# Patient Record
Sex: FEMALE
[De-identification: names, ages, dates, MRNs, and addresses within clinical notes are randomized per-mention and may not be internally consistent; named-entity substitution may affect disease eponyms.]

---

## 2017-05-28 ENCOUNTER — HOSPITAL ENCOUNTER (EMERGENCY)
Dept: HOSPITAL 31 - C.ER | Age: 67
Discharge: HOME | End: 2017-05-28
Payer: MEDICARE

## 2017-05-28 VITALS — OXYGEN SATURATION: 100 %

## 2017-05-28 VITALS
SYSTOLIC BLOOD PRESSURE: 122 MMHG | RESPIRATION RATE: 18 BRPM | DIASTOLIC BLOOD PRESSURE: 74 MMHG | HEART RATE: 85 BPM | TEMPERATURE: 98 F
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
## 2017-05-28 DIAGNOSIS — S16.1XXA: Primary | ICD-10-CM
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
## 2017-05-28 LAB
BILIRUB UR-MCNC: NEGATIVE MG/DL
GLUCOSE UR STRIP-MCNC: NORMAL MG/DL
KETONES UR STRIP-MCNC: NEGATIVE MG/DL
LEUKOCYTE ESTERASE UR-ACNC: (no result) LEU/UL
PH UR STRIP: 5 [PH] (ref 5–8)
PROT UR STRIP-MCNC: NEGATIVE MG/DL
RBC # UR STRIP: NEGATIVE /UL
RBC #/AREA URNS HPF: 1 /HPF (ref 0–3)
SP GR UR STRIP: 1.02 (ref 1–1.03)
UROBILINOGEN UR-MCNC: NORMAL MG/DL (ref 0.2–1)
WBC #/AREA URNS HPF: 4 /HPF (ref 0–5)

## 2017-05-28 NOTE — C.PDOC
History Of Present Illness


65 yo female come in for evaluation of Right sided neck pain and swelling 

gradually developed for past 2 days. Pt reports, pain gradually started 

yesterday in AM over Right sided occipital area and neck and gradually worsen 

during the day, associated with some swelling over upper back. Pain is 

localized over Right neck and upper back and worse with Right arm movement. 

Otherwise, pt denies fever, chills, denies worse headache of life, dizziness, 

vertigo, visual changes, focal deficits, recent illness, sore throat, CP, SOB, 

dyspnea, diaphoresis, palpitation, abd. pain, N/V/D, back pain, UTI sx, denies 

weakness, sensory or vascular deficits to B/L UEs. Ambulate to ED for evaluation

, appears in pain.


Time Seen by Provider: 05/28/17 20:19


Chief Complaint (Nursing): Back Pain





Past Medical History


Vital Signs: 


 Last Vital Signs











Temp  98 F   05/28/17 21:14


 


Pulse  85   05/28/17 21:14


 


Resp  18   05/28/17 21:14


 


BP  122/74   05/28/17 21:14


 


Pulse Ox  100   05/28/17 21:14











Surgical History: Appendectomy





- [facility] Procedures








DIPHTHERIA TOXOID ADMIN (06/03/14)


IMMOBILIZ/WOUND ATTN NEC (05/15/14)


TETANUS TOXOID ADMINIST (06/03/14)








Family History: States: No Known Family Hx





- Social History


Hx Alcohol Use: No


Hx Substance Use: No





- Immunization History


Hx Tetanus Toxoid Vaccination: No


Hx Influenza Vaccination: No


Hx Pneumococcal Vaccination: No





Physical Exam





- Physical Exam


Appears: Well, Non-toxic, No Acute Distress


Skin: Normal Color, Warm, Dry, No Rash


Eye(s): bilateral: PERRL


Nose: No Flaring


Oral Mucosa: Moist


Throat: Normal, No Erythema, No Exudate, No Drooling


Neck: No Midline Cervical Tenderness, Paracervical Tenderness (Diffuse Right 

sided lateral neck reproducible tenderness over trapezium muscle with moderate 

muscle spasm extend from occipital area down to Right upper arm.), No Step Off 

Deformity, Supple


Chest: Symmetrical, No Deformity, No Tenderness


Cardiovascular: Rhythm Regular


Respiratory: Normal Breath Sounds, No Stridor, No Wheezing


Back: Normal Inspection, No Vertebral Tenderness, No Paraspinal Tenderness


Extremity: No Pedal Edema


Neurological/Psych: Oriented x3, Normal Speech





ED Course And Treatment


O2 Sat by Pulse Oximetry: 100


Pulse Ox Interpretation: Normal





- Other Rad


  ** C-spine


X-Ray: Interpreted by Me, Viewed By Me


Interpretation: (-) acute fx or sublux





  ** Right shoulder 


X-Ray: Interpreted by Me, Viewed By Me


Interpretation: (+) mod DJD, no acute fx or dislocation


Progress Note: On re-evaluation, pt is afebrile, hemodynamicaly stable.  Non-

toxic.  Pt reports, moderate improvement in pain.  head: AT/NC.  ENT: no acute 

findings.  neck: (+) Right sided cervical strain with muscle spasm. No midline 

tenderness, no skin changes.  Lungs: CTA B/L, BS equal B/L.  Neurologicaly 

intact.  Imaging review and appears without acute abnormalities.  Pt was 

advised on course of ds.  ref. to f/u with PMD in 2-3 days for re-eavl.  Return 

if any worsening or new changes.





Disposition


Counseled Patient/Family Regarding: Studies Performed, Diagnosis, Need For 

Followup, Rx Given





- Disposition


Referrals: 


Ralph Lyons MD [Staff Provider] - 


Disposition: HOME/ ROUTINE


Disposition Time: 20:55


Condition: STABLE


Additional Instructions: 


Light duty to Right arm





Take medication as prescribed





Follow up with PMD in 2-3 days for re-evaluation.


return to ED if any worsening or new changes.


Prescriptions: 


Methocarbamol [Robaxin] 500 mg PO TID #14 tab


traMADol [Ultram] 50 mg PO TID #7 tab


Instructions:  Cervical Sprain (ED), Muscle Spasm (ED)





- Clinical Impression


Clinical Impression: 


 Cervical strain

## 2017-05-29 NOTE — RAD
PROCEDURE:  Cervical Spine Radiographs.



HISTORY:

Pain. 



COMPARISON:

None. 



FINDINGS:



BONES:

Alignment maintained. No fracture.  Dens Intact. 



DISC SPACES:

Degenerative changes C5-6 and to lesser extent C6-7. This includes 

disc space narrowing and non marginal osteophyte formation. 



SOFT TISSUES:

Normal. No prevertebral soft tissue swelling. 



OTHER FINDINGS:

None.



IMPRESSION:

No acute findings related to/accounting  for the clinical 

presentation.

## 2017-05-29 NOTE — RAD
PROCEDURE:  Radiographs of the Right Shoulder



HISTORY:

pain







COMPARISON:

Six



FINDINGS:



BONES:

No acute fracture.



JOINTS:

Degenerative changes, of right acromioclavicular joint -severe.  

Moderate glenohumeral degenerative change with high riding humeral 

head relative to the glenoid indicative of rotator cuff disease. 

Medial inferior humeral head spur adjacent to the glenoid.



SOFT TISSUES:

Normal.



OTHER FINDINGS:

None.



IMPRESSION:

Degenerative changes as described. No acute findings.



___________________________________________________________



Concordant results with the preliminary interpretation rendered by 

the emergency department physician

procedure.

## 2017-05-31 ENCOUNTER — HOSPITAL ENCOUNTER (EMERGENCY)
Dept: HOSPITAL 31 - C.ER | Age: 67
LOS: 1 days | Discharge: HOME | End: 2017-06-01
Payer: MEDICARE

## 2017-05-31 DIAGNOSIS — T39.395A: ICD-10-CM
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
## 2017-05-31 DIAGNOSIS — L29.9: Primary | ICD-10-CM

## 2017-05-31 PROCEDURE — 96375 TX/PRO/DX INJ NEW DRUG ADDON: CPT

## 2017-05-31 PROCEDURE — 96374 THER/PROPH/DIAG INJ IV PUSH: CPT

## 2017-05-31 PROCEDURE — 99284 EMERGENCY DEPT VISIT MOD MDM: CPT

## 2017-06-01 VITALS
OXYGEN SATURATION: 100 % | SYSTOLIC BLOOD PRESSURE: 157 MMHG | DIASTOLIC BLOOD PRESSURE: 88 MMHG | HEART RATE: 64 BPM | RESPIRATION RATE: 16 BRPM | TEMPERATURE: 98.1 F

## 2017-06-01 NOTE — C.PDOC
History Of Present Illness


Patient is a 66 year old female who presents to the ER with a complaint 

itchiness and redness to the face and upper extremities after taking 

diclofenac. Patient denies SOB or throat swelling.


Chief Complaint (Nursing): Allergic Reaction


History Per: Patient


History/Exam Limitations: no limitations


Onset/Duration Of Symptoms: Hrs


Current Symptoms Are (Timing): Still Present


Possible Cause: Medication (diclofenac)


Associated Symptoms: Itching, Redness (Face and upper extremities).  denies: 

Swelling, Trouble Swallowing


Home/EMS Treatment: None


Recent travel outside of the [place]: No





Past Medical History


Reviewed: Historical Data, Nursing Documentation, Vital Signs


Vital Signs: 


 Last Vital Signs











Temp  98 F   05/31/17 23:38


 


Pulse  139 H  05/31/17 23:38


 


Resp  20   05/31/17 23:38


 


BP  118/76   05/31/17 23:38


 


Pulse Ox  96   06/01/17 00:34














- Medical History


PMH: HTN


Surgical History: Appendectomy





- [facility] Procedures








DIPHTHERIA TOXOID ADMIN (06/03/14)


IMMOBILIZ/WOUND ATTN NEC (05/15/14)


TETANUS TOXOID ADMINIST (06/03/14)








Family History: States: Unknown Family Hx





- Social History


Hx Alcohol Use: No


Hx Substance Use: No





- Immunization History


Hx Tetanus Toxoid Vaccination: No


Hx Influenza Vaccination: No


Hx Pneumococcal Vaccination: No





Review Of Systems


ENT: Negative for: Throat Swelling


Respiratory: Negative for: Shortness of Breath


Skin: Positive for: Other (Redness to face and upper extremities. Itchiness)





Physical Exam





- Physical Exam


Appears: Non-toxic, Other (mild distress from itchiness)


Skin: Warm, Dry, Other (Slight redness to face and upper extremities)


Head: Atraumatic, Normacephalic


Eye(s): bilateral: Normal Inspection, EOMI


Oral Mucosa: Moist


Chest: Symmetrical, No Tenderness


Cardiovascular: Rhythm Regular, No Murmur


Respiratory: Normal Breath Sounds, No Rales, No Rhonchi, No Wheezing


Gastrointestinal/Abdominal: Soft, No Tenderness


Neurological/Psych: Oriented x3, Normal Speech, Normal Cognition





ED Course And Treatment


O2 Sat by Pulse Oximetry: 96 (Room air)


Pulse Ox Interpretation: Normal


Progress Note: Benadryl, pepcid and solu-medrol administered.





Disposition


Counseled Patient/Family Regarding: Diagnosis





- Disposition


Referrals: 


[facility] [Outside]


Disposition: HOME/ ROUTINE


Disposition Time: 01:26


Condition: IMPROVED


Prescriptions: 


DiphenhydrAMINE [Benadryl] 25 mg PO Q6 #20 cap


Famotidine [Pepcid] 20 mg PO BID #14 tab


Methylprednisolone [Medrol Dose Pack (21 tabs)] 4 mg PO DAILY #21 mg


Instructions:  General Allergic Reaction (ED)


Forms:  Gen Discharge Inst Romansh


Print Language: Syriac





- POA


Present On Arrival: None





- Clinical Impression


Clinical Impression: 


 Allergic reaction








- Scribe Statement


The provider has reviewed the documentation as recorded by the Scribeliud Dunham





All medical record entries made by the Davidibeliud were at my direction and 

personally dictated by me. I have reviewed the chart and agree that the record 

accurately reflects my personal performance of the history, physical exam, 

medical decision making, and the department course for this patient. I have 

also personally directed, reviewed, and agree with the discharge instructions 

and disposition.

## 2017-07-13 ENCOUNTER — HOSPITAL ENCOUNTER (OUTPATIENT)
Dept: HOSPITAL 31 - C.ENDO | Age: 67
Discharge: HOME | End: 2017-07-13
Attending: INTERNAL MEDICINE
Payer: MEDICARE

## 2017-07-13 VITALS — SYSTOLIC BLOOD PRESSURE: 110 MMHG | HEART RATE: 56 BPM | RESPIRATION RATE: 13 BRPM | DIASTOLIC BLOOD PRESSURE: 48 MMHG

## 2017-07-13 VITALS — OXYGEN SATURATION: 100 %

## 2017-07-13 VITALS — TEMPERATURE: 97.8 F

## 2017-07-13 VITALS — BODY MASS INDEX: 23.8 KG/M2

## 2017-07-13 DIAGNOSIS — Z98.82: ICD-10-CM

## 2017-07-13 DIAGNOSIS — E11.9: ICD-10-CM

## 2017-07-13 DIAGNOSIS — Z12.11: Primary | ICD-10-CM

## 2017-07-13 DIAGNOSIS — E78.5: ICD-10-CM

## 2017-07-13 DIAGNOSIS — D12.5: ICD-10-CM

## 2017-07-13 DIAGNOSIS — Z88.8: ICD-10-CM

## 2017-07-13 DIAGNOSIS — K29.70: ICD-10-CM

## 2017-07-13 DIAGNOSIS — Z98.890: ICD-10-CM

## 2017-07-13 DIAGNOSIS — Z79.84: ICD-10-CM

## 2017-07-13 DIAGNOSIS — K64.0: ICD-10-CM

## 2017-07-13 DIAGNOSIS — Z88.6: ICD-10-CM

## 2017-07-13 DIAGNOSIS — Z79.899: ICD-10-CM

## 2017-08-12 ENCOUNTER — HOSPITAL ENCOUNTER (EMERGENCY)
Dept: HOSPITAL 31 - C.ER | Age: 67
Discharge: HOME | End: 2017-08-12
Payer: MEDICARE

## 2017-08-12 VITALS
SYSTOLIC BLOOD PRESSURE: 130 MMHG | DIASTOLIC BLOOD PRESSURE: 81 MMHG | TEMPERATURE: 98.2 F | HEART RATE: 61 BPM | RESPIRATION RATE: 20 BRPM

## 2017-08-12 VITALS — BODY MASS INDEX: 23.8 KG/M2

## 2017-08-12 VITALS — OXYGEN SATURATION: 100 %

## 2017-08-12 DIAGNOSIS — L50.0: Primary | ICD-10-CM

## 2017-08-12 PROCEDURE — 99283 EMERGENCY DEPT VISIT LOW MDM: CPT

## 2017-08-12 PROCEDURE — 96372 THER/PROPH/DIAG INJ SC/IM: CPT

## 2017-08-12 NOTE — C.PDOC
History Of Present Illness


The patient is a 67yo female, presents to the ED with complaints of an itching 

rash all over her body, starting since this morning. Patient is unsure as to 

the cause of the rash and denies any new clothes, lotion use or eating new 

foods. Pt also took claritin and benadryl today with no relief. She denies any 

trouble breathing or swallowing. She currently, offers no additional medical 

complaints.


Time Seen by Provider: 08/12/17 18:58


Chief Complaint (Nursing): Allergic Reaction


History Per: Patient


History/Exam Limitations: no limitations


Onset/Duration Of Symptoms: Days


Current Symptoms Are (Timing): Still Present


Possible Cause: Unknown


Associated Symptoms: Skin Rash, Itching





Past Medical History


Reviewed: Historical Data, Nursing Documentation, Vital Signs


Vital Signs: 


 Last Vital Signs











Temp  98.2 F   08/12/17 19:56


 


Pulse  61   08/12/17 19:56


 


Resp  20   08/12/17 19:56


 


BP  130/81   08/12/17 19:56


 


Pulse Ox  100   08/12/17 20:00














- Medical History


PMH: Anxiety, Depression, HTN, Hypercholesterolemia, Osteoporosis


   Denies: Chronic Kidney Disease


Surgical History: Appendectomy, Endoscopy





- [facility] Procedures








DIPHTHERIA TOXOID ADMIN (06/03/14)


IMMOBILIZ/WOUND ATTN NEC (05/15/14)


TETANUS TOXOID ADMINIST (06/03/14)








Family History: States: Unknown Family Hx





- Social History


Hx Alcohol Use: No


Hx Substance Use: No





- Immunization History


Hx Tetanus Toxoid Vaccination: No


Hx Influenza Vaccination: No


Hx Pneumococcal Vaccination: No





Review Of Systems


ENT: Negative for: Throat Swelling


Respiratory: Negative for: Shortness of Breath


Skin: Positive for: Rash





Physical Exam





- Physical Exam


Appears: Well, No Acute Distress


Skin: Rash (urticarial rash to upper extremities and torso)


Head: Atraumatic, Normacephalic


Eye(s): bilateral: Normal Inspection, EOMI


Nose: Normal


Oral Mucosa: Moist


Tongue: Normal Appearing, No Swelling


Lips: Normal Appearing, No Swelling


Throat: Normal, No Erythema, No Exudate, No Drooling


Neck: Normal ROM


Chest: Symmetrical


Cardiovascular: Rhythm Regular


Respiratory: Normal Breath Sounds, No Accessory Muscle Use, No Rhonchi, No 

Wheezing


Extremity: Bilateral: Atraumatic, Normal Color And Temperature, Normal ROM


Neurological/Psych: Oriented x3, Normal Speech


Gait: Steady





ED Course And Treatment


O2 Sat by Pulse Oximetry: 100 (RA)


Pulse Ox Interpretation: Normal





Medical Decision Making


Medical Decision Making: 


Impression: Urticarial rash


Plan:


* Benadryl IM


* Prednisone PO





Patient with itching and hives to body. Benadryl IM given at patient request 

and prednisone PO. Advise patient to avoid potential allergens and to continue 

using antihistamine.





Disposition


Counseled Patient/Family Regarding: Diagnosis, Need For Followup, Rx Given





- Disposition


Disposition: HOME/ ROUTINE


Disposition Time: 19:52


Condition: IMPROVED


Additional Instructions: 


You may continue taking Benadryl or allergy medicine 25-50mg every 4-6 hours as 

needed for itching and rash


Avoid any potential allergens





Usted puede seguir tomando Benadryl o medicina para la alergia 25-50mg cada 4-6 

horas segn sea necesario para picazn y erupcin cutnea


Evitar cualquier alergeno potencial


Prescriptions: 


Prednisone 50 mg PO DAILY #4 tablet


Instructions:  Urticaria (ED)


Forms:  [facility] (English)


Print Language: Malay





- POA


Present On Arrival: None





- Clinical Impression


Clinical Impression: 


 Allergic urticaria








- Scribe Statement


The provider has reviewed the documentation as recorded by the Candace Jewell


Provider Attestation:


All medical record entries made by the Candace were at my direction and 

personally dictated by me. I have reviewed the chart and agree that the record 

accurately reflects my personal performance of the history, physical exam, 

medical decision making, and the department course for this patient. I have 

also personally directed, reviewed, and agree with the discharge instructions 

and disposition.

## 2018-07-10 ENCOUNTER — HOSPITAL ENCOUNTER (EMERGENCY)
Dept: HOSPITAL 31 - C.ER | Age: 68
LOS: 1 days | Discharge: HOME | End: 2018-07-11
Payer: MEDICARE

## 2018-07-10 VITALS — BODY MASS INDEX: 23.8 KG/M2

## 2018-07-10 DIAGNOSIS — E78.00: ICD-10-CM

## 2018-07-10 DIAGNOSIS — R10.9: ICD-10-CM

## 2018-07-10 DIAGNOSIS — I10: ICD-10-CM

## 2018-07-10 DIAGNOSIS — K86.2: Primary | ICD-10-CM

## 2018-07-10 LAB
ALBUMIN SERPL-MCNC: 4.4 G/DL (ref 3.5–5)
ALBUMIN/GLOB SERPL: 1.3 {RATIO} (ref 1–2.1)
ALT SERPL-CCNC: 27 U/L (ref 9–52)
APTT BLD: 28 SECONDS (ref 21–34)
AST SERPL-CCNC: 28 U/L (ref 14–36)
BASOPHILS # BLD AUTO: 0 K/UL (ref 0–0.2)
BASOPHILS NFR BLD: 0.3 % (ref 0–2)
BILIRUB UR-MCNC: NEGATIVE MG/DL
BUN SERPL-MCNC: 16 MG/DL (ref 7–17)
CALCIUM SERPL-MCNC: 9.3 MG/DL (ref 8.6–10.4)
EOSINOPHIL # BLD AUTO: 0.1 K/UL (ref 0–0.7)
EOSINOPHIL NFR BLD: 0.6 % (ref 0–4)
EOSINOPHIL NFR BLD: 2 % (ref 0–4)
ERYTHROCYTE [DISTWIDTH] IN BLOOD BY AUTOMATED COUNT: 14.1 % (ref 11.5–14.5)
GFR NON-AFRICAN AMERICAN: > 60
GLUCOSE UR STRIP-MCNC: NORMAL MG/DL
HGB BLD-MCNC: 13.7 G/DL (ref 11–16)
INR PPP: 1.1
LEUKOCYTE ESTERASE UR-ACNC: (no result) LEU/UL
LIPASE: 84 U/L (ref 23–300)
LYMPHOCYTE: 7 % (ref 20–40)
LYMPHOCYTES # BLD AUTO: 0.9 K/UL (ref 1–4.3)
LYMPHOCYTES NFR BLD AUTO: 8.7 % (ref 20–40)
MCH RBC QN AUTO: 30.9 PG (ref 27–31)
MCHC RBC AUTO-ENTMCNC: 33.1 G/DL (ref 33–37)
MCV RBC AUTO: 93.3 FL (ref 81–99)
MONOCYTE: 1 % (ref 0–10)
MONOCYTES # BLD: 0.3 K/UL (ref 0–0.8)
MONOCYTES NFR BLD: 2.3 % (ref 0–10)
NEUTROPHILS # BLD: 9.5 K/UL (ref 1.8–7)
NEUTROPHILS NFR BLD AUTO: 85 % (ref 50–75)
NEUTROPHILS NFR BLD AUTO: 88.1 % (ref 50–75)
NEUTS BAND NFR BLD: 3 % (ref 0–2)
NRBC BLD AUTO-RTO: 0.1 % (ref 0–2)
PH UR STRIP: 5 [PH] (ref 5–8)
PLATELET # BLD EST: NORMAL 10*3/UL
PLATELET # BLD: 319 K/UL (ref 130–400)
PMV BLD AUTO: 7.8 FL (ref 7.2–11.7)
PROT UR STRIP-MCNC: NEGATIVE MG/DL
PROTHROMBIN TIME: 12.2 SECONDS (ref 9.7–12.2)
RBC # BLD AUTO: 4.45 MIL/UL (ref 3.8–5.2)
RBC # UR STRIP: NEGATIVE /UL
RBC MORPH BLD: NORMAL
SP GR UR STRIP: 1.03 (ref 1–1.03)
SQUAMOUS EPITHIAL: 8 /HPF (ref 0–5)
TOTAL CELLS COUNTED BLD: 100
UROBILINOGEN UR-MCNC: NORMAL MG/DL (ref 0.2–1)
VARIANT LYMPHS NFR BLD MANUAL: 2 % (ref 0–0)
WBC # BLD AUTO: 10.8 K/UL (ref 4.8–10.8)

## 2018-07-10 PROCEDURE — 80053 COMPREHEN METABOLIC PANEL: CPT

## 2018-07-10 PROCEDURE — 96374 THER/PROPH/DIAG INJ IV PUSH: CPT

## 2018-07-10 PROCEDURE — 74177 CT ABD & PELVIS W/CONTRAST: CPT

## 2018-07-10 PROCEDURE — 99285 EMERGENCY DEPT VISIT HI MDM: CPT

## 2018-07-10 PROCEDURE — 96375 TX/PRO/DX INJ NEW DRUG ADDON: CPT

## 2018-07-10 PROCEDURE — 85730 THROMBOPLASTIN TIME PARTIAL: CPT

## 2018-07-10 PROCEDURE — 83690 ASSAY OF LIPASE: CPT

## 2018-07-10 PROCEDURE — 81001 URINALYSIS AUTO W/SCOPE: CPT

## 2018-07-10 PROCEDURE — 86850 RBC ANTIBODY SCREEN: CPT

## 2018-07-10 PROCEDURE — 82948 REAGENT STRIP/BLOOD GLUCOSE: CPT

## 2018-07-10 PROCEDURE — 85025 COMPLETE CBC W/AUTO DIFF WBC: CPT

## 2018-07-10 PROCEDURE — 85610 PROTHROMBIN TIME: CPT

## 2018-07-10 PROCEDURE — 86900 BLOOD TYPING SEROLOGIC ABO: CPT

## 2018-07-10 NOTE — C.PDOC
History Of Present Illness


66 y/o female presents to the ER complaining of mid epigastric and 

periumbilical pain which began around 11 am today. Patient states that she has 

associated vomiting. Patient describes the pain as sharp, stabbing, and 

cramping and she rates the pain 4/10. Denies having fever and chills.


Time Seen by Provider: 07/10/18 21:22


Chief Complaint (Nursing): Abdominal Pain


History Per: Patient


History/Exam Limitations: no limitations


Onset/Duration Of Symptoms: Hrs


Current Symptoms Are (Timing): Still Present


Severity: Moderate


Location Of Pain/Discomfort: Epigastric, Periumbilical


Quality Of Discomfort: Sharp, Cramping, Stabbing


Associated Symptoms: Vomiting.  denies: Fever, Chills





Past Medical History


Reviewed: Historical Data, Nursing Documentation, Vital Signs


Vital Signs: 


 Last Vital Signs











Temp  98.3 F   07/10/18 20:19


 


Pulse  95 H  07/10/18 21:46


 


Resp  22   07/10/18 21:46


 


BP  111/77   07/10/18 21:46


 


Pulse Ox  100   07/10/18 21:46














- Medical History


PMH: Anxiety, Depression, HTN, Hypercholesterolemia, Osteoporosis


   Denies: Chronic Kidney Disease


Surgical History: Appendectomy, Endoscopy





- [facility] Procedures








DIPHTHERIA TOXOID ADMIN (06/03/14)


IMMOBILIZ/WOUND ATTN NEC (05/15/14)


TETANUS TOXOID ADMINIST (06/03/14)








Family History: States: No Known Family Hx





- Social History


Hx Alcohol Use: No


Hx Substance Use: No





- Immunization History


Hx Tetanus Toxoid Vaccination: No


Hx Influenza Vaccination: Yes


Hx Pneumococcal Vaccination: No





Review Of Systems


Constitutional: Negative for: Fever, Chills


Gastrointestinal: Positive for: Vomiting, Abdominal Pain.  Negative for: Nausea

, Diarrhea





Physical Exam





- Physical Exam


Appears: Non-toxic, No Acute Distress


Skin: Warm, Dry


Head: Normacephalic


Eye(s): bilateral: Normal Inspection


Oral Mucosa: Dry


Neck: Supple


Chest: Symmetrical


Cardiovascular: Rhythm Regular


Respiratory: No Rales, No Rhonchi, No Wheezing


Gastrointestinal/Abdominal: Soft, Tenderness (diffuse tenderness especially in 

periumbilical region), No Guarding, No Rebound


Extremity: Normal ROM


Neurological/Psych: Oriented x3





ED Course And Treatment





- Laboratory Results


Result Diagrams: 


 07/10/18 21:38





 07/10/18 21:38


O2 Sat by Pulse Oximetry: 98 (RA)


Pulse Ox Interpretation: Normal


Progress Note: Labs and UA ordered. Patient treated with Zofran IV, Morphine IV

, and IV Fluids.


Reevaluation Time: 01:11


Reassessment Condition: Improved





Medical Decision Making


Medical Decision Making: 





Upon provider reevaluation patient is feeling better, is medically stable, and 

requires no further treatment in the ED at this time. Patient will be 

discharged home  . Counseling was provided and all questions were answered 

regarding diagnosis and need for follow up with dr lyons. There is agreement 

to discharge plan. Return if symptoms persist or worsen.





Disposition


Counseled Patient/Family Regarding: Studies Performed, Diagnosis, Need For 

Followup, Rx Given





- Disposition


Referrals: 


Ralph Lyons MD [Primary Care Provider] - 


Disposition: HOME/ ROUTINE


Disposition Time: 21:22


Condition: FAIR


Additional Instructions: 


Please return if symptoms recur. Do follow up with dr lyons regarding the 1.5 

cm pancreatic tail cyst


Prescriptions: 


Dicyclomine [Dicyclomine HCl] 10 mg PO QID #20 cap


Instructions:  Acute Abdomen (Belly Pain), Adult (DC)


Forms:  [facility] (English)





- Clinical Impression


Clinical Impression: 


 Abdominal pain, Pancreatic cyst








- Scribe Statement


The provider has reviewed the documentation as recorded by the Candace Proctor


Provider Attestation: 





All medical record entries made by the Davidibe were at my direction and 

personally dictated by me. I have reviewed the chart and agree that the record 

accurately reflects my personal performance of the history, physical exam, 

medical decision making, and the department course for this patient. I have 

also personally directed, reviewed, and agree with the discharge instructions 

and disposition.

## 2018-07-11 VITALS
TEMPERATURE: 99.7 F | RESPIRATION RATE: 20 BRPM | DIASTOLIC BLOOD PRESSURE: 76 MMHG | HEART RATE: 85 BPM | SYSTOLIC BLOOD PRESSURE: 128 MMHG | OXYGEN SATURATION: 97 %

## 2018-07-11 NOTE — CT
Date of service: 



07/10/2018



PROCEDURE:  CT Abdomen and Pelvis without intravenous contrast



HISTORY:

periumbilical pain



COMPARISON:

None.



TECHNIQUE:

Multiple contiguous axial images were performed through the abdomen 

and pelvis with the use of intravenous contrast.  Subsequently, 

sagittal and coronal reformatted images were obtained. 



Radiation dose:



Total exam DLP = 336 mGy-cm.



This CT exam was performed using one or more of the following dose 

reduction techniques: Automated exposure control, adjustment of the 

mA and/or kV according to patient size, and/or use of iterative 

reconstruction technique.



FINDINGS:



LOWER THORAX:

Unremarkable. 



LIVER:

Unremarkable. No gross lesion or ductal dilatation.  



GALLBLADDER AND BILE DUCTS:

Mild gallbladder dilatation. 



PANCREAS:

1.5 centimeter rounded low-attenuation pancreatic lesion which has a 

Hounsfield unit attenuation of approximately 2. This may represent a 

cystic pancreatic lesion. Differential considerations may include a 

pancreatic pseudocyst versus cystic pancreatic neoplasm versus 

additional etiology. Further evaluation with multiphasic CT or MR is 

recommended if clinically indicated.



SPLEEN:

Unremarkable. 



ADRENALS:

Unremarkable. No mass. 



KIDNEYS AND URETERS:

Horseshoe kidney noted. 



VASCULATURE:

Unremarkable. No aortic aneurysm. 



BOWEL:

Stomach is fluid-filled and mildly dilated.



APPENDIX:

Normal appendix is not seen.  Postsurgical changes near the cecum, 

which are likely from prior appendectomy.  Clinical correlation. 



PERITONEUM:

Unremarkable. No free fluid. No free air. 



LYMPH NODES:

Unremarkable. No enlarged lymph nodes. 



BLADDER:

Unremarkable. 



REPRODUCTIVE:

Unremarkable. 



BONES:

Degenerative changes in the spine. Anterolisthesis of L5 on S1. 



OTHER FINDINGS:

Bilateral breast implants.



IMPRESSION:

1.5 centimeter cystic lesion/mass in the pancreatic tail. If the 

patient is at high risk for pancreatic malignancy, consider 

nonemergent follow-up pancreatic CT or MRI for further evaluation. 

Clinical correlation.



Stomach is fluid-filled and mildly dilated.



Horseshoe kidney.



Degenerative changes in the spine. Anterolisthesis of L5 on S1. 

Correlation with MRI may be helpful if clinically indicated. 



Additional findings as above.



These findings were preliminarily reported at 12:38 a.m. on 

07/11/2018 by Dr. Nivia Hoskins from [employer].

## 2018-09-28 ENCOUNTER — HOSPITAL ENCOUNTER (EMERGENCY)
Dept: HOSPITAL 31 - C.ER | Age: 68
Discharge: HOME | End: 2018-09-28
Payer: MEDICARE

## 2018-09-28 VITALS
RESPIRATION RATE: 16 BRPM | DIASTOLIC BLOOD PRESSURE: 52 MMHG | HEART RATE: 67 BPM | SYSTOLIC BLOOD PRESSURE: 103 MMHG | TEMPERATURE: 97.8 F

## 2018-09-28 VITALS — BODY MASS INDEX: 25.6 KG/M2

## 2018-09-28 VITALS — OXYGEN SATURATION: 96 %

## 2018-09-28 DIAGNOSIS — L29.9: Primary | ICD-10-CM

## 2018-09-28 DIAGNOSIS — T39.315A: ICD-10-CM

## 2018-09-28 PROCEDURE — 99285 EMERGENCY DEPT VISIT HI MDM: CPT

## 2018-09-28 PROCEDURE — 96375 TX/PRO/DX INJ NEW DRUG ADDON: CPT

## 2018-09-28 PROCEDURE — 96374 THER/PROPH/DIAG INJ IV PUSH: CPT

## 2018-09-28 PROCEDURE — 96361 HYDRATE IV INFUSION ADD-ON: CPT

## 2018-09-28 NOTE — C.PDOC
History Of Present Illness


66 y/o female presents to ED for evaluation of allergic reaction after taking 1 

tab of Naproxen 500mg this morning at 09:00. Pt states she developed numbness in

her tongue, and itching throughout her body 10 minutes after taking the 

medication. Denies difficulty swallowing, throat swelling, chest pain, shortness

of breath, or other associated symptoms. 





Time Seen by Provider: 09/28/18 09:23


Chief Complaint (Nursing): Allergic Reaction


History Per: Patient


History/Exam Limitations: no limitations





Past Medical History


Reviewed: Historical Data, Nursing Documentation, Vital Signs


Vital Signs: 





                                Last Vital Signs











Temp  98.6 F   09/28/18 09:23


 


Pulse  135 H  09/28/18 09:23


 


Resp  22   09/28/18 09:23


 


BP  86/58 L  09/28/18 09:23


 


Pulse Ox  96   09/28/18 09:23














- Medical History


PMH: Anxiety, Depression, HTN, Hypercholesterolemia, Osteoporosis


   Denies: Chronic Kidney Disease


Surgical History: Appendectomy, Endoscopy





- [facility] Procedures











DIPHTHERIA TOXOID ADMIN (06/03/14)


IMMOBILIZ/WOUND ATTN NEC (05/15/14)


TETANUS TOXOID ADMINIST (06/03/14)








Family History: States: Unknown Family Hx





- Social History


Hx Alcohol Use: No


Hx Substance Use: No





- Immunization History


Hx Tetanus Toxoid Vaccination: No


Hx Influenza Vaccination: No


Hx Pneumococcal Vaccination: No





Review Of Systems


Except As Marked, All Systems Reviewed And Found Negative.


Constitutional: Negative for: Fever, Chills


ENT: Negative for: Mouth Swelling, Throat Pain, Throat Swelling


Cardiovascular: Negative for: Chest Pain


Respiratory: Negative for: Shortness of Breath


Skin: Positive for: Other (diffuse redness and itching)


Neurological: Positive for: Numbness (tongue)





Physical Exam





- Physical Exam


Appears: Non-toxic, No Acute Distress


Skin: Warm, Dry, Other (diffuse erythema, pt noted scratching herself)


Head: Atraumatic, Normacephalic


Eye(s): bilateral: Normal Inspection


Oral Mucosa: Moist


Tongue: Normal Appearing, No Swelling, No Erythema


Lips: Normal Appearing, No Swelling


Neck: Normal ROM, Supple


Chest: Symmetrical


Cardiovascular: Rhythm Regular, No Murmur


Respiratory: Normal Breath Sounds, No Accessory Muscle Use, No Rales, No 

Rhonchi, No Wheezing


Gastrointestinal/Abdominal: Soft, No Tenderness


Extremity: Normal ROM, No Deformity


Neurological/Psych: Oriented x3, Normal Speech





ED Course And Treatment


O2 Sat by Pulse Oximetry: 96 (RA)


Pulse Ox Interpretation: Normal


Progress Note: Treated with benadryl, solumedrol and pepcid.  On re-evaluation 

lung clear feeling better.  Discharged in stable condition


Reassessment Condition: Improved





Medical Decision Making


Medical Decision Making: 


Plan:


Benadryl


Pepcid


Solu-Medrol


IV fluids








Disposition


Counseled Patient/Family Regarding: Studies Performed, Diagnosis, Need For 

Followup





- Disposition


Referrals: 


aRlph Lyons MD [Staff Provider] - 


Disposition: HOME/ ROUTINE


Disposition Time: 12:00


Condition: IMPROVED


Additional Instructions: 


Stop taking naproxen


Return to ED if any increase symptoms


Prescriptions: 


predniSONE [Prednisone] 40 mg PO DAILY #10 tab


Instructions:  Hives, Side Effects From Medicines


Forms:  [facility] (English)


Print Language: Malay





- Clinical Impression


Clinical Impression: 


 Allergic reaction caused by a drug








- PA / NP / Resident Statement


MD/DO has reviewed & agrees with the documentation as recorded.





- Scribe Statement


The provider has reviewed the documentation as recorded by the Scribe





KP





All medical record entries made by the Scribe were at my direction and 

personally dictated by me. I have reviewed the chart and agree that the record 

accurately reflects my personal performance of the history, physical exam, 

medical decision making, and the department course for this patient. I have also

 personally directed, reviewed, and agree with the discharge instructions and 

disposition.

## 2018-12-20 ENCOUNTER — HOSPITAL ENCOUNTER (EMERGENCY)
Dept: HOSPITAL 31 - C.ER | Age: 68
Discharge: HOME | End: 2018-12-20
Payer: MEDICARE

## 2018-12-20 VITALS
SYSTOLIC BLOOD PRESSURE: 128 MMHG | TEMPERATURE: 98 F | DIASTOLIC BLOOD PRESSURE: 72 MMHG | OXYGEN SATURATION: 97 % | HEART RATE: 80 BPM | RESPIRATION RATE: 20 BRPM

## 2018-12-20 VITALS — BODY MASS INDEX: 23.8 KG/M2

## 2018-12-20 DIAGNOSIS — E78.00: ICD-10-CM

## 2018-12-20 DIAGNOSIS — N39.0: Primary | ICD-10-CM

## 2018-12-20 DIAGNOSIS — I10: ICD-10-CM

## 2018-12-20 DIAGNOSIS — R10.12: ICD-10-CM

## 2018-12-20 LAB
ALBUMIN SERPL-MCNC: 4.3 G/DL (ref 3.5–5)
ALBUMIN/GLOB SERPL: 1.4 {RATIO} (ref 1–2.1)
ALT SERPL-CCNC: 11 U/L (ref 9–52)
AST SERPL-CCNC: 47 U/L (ref 14–36)
BASOPHILS # BLD AUTO: 0.1 K/UL (ref 0–0.2)
BASOPHILS NFR BLD: 0.5 % (ref 0–2)
BILIRUB UR-MCNC: NEGATIVE MG/DL
BUN SERPL-MCNC: 19 MG/DL (ref 7–17)
CALCIUM SERPL-MCNC: 9 MG/DL (ref 8.6–10.4)
EOSINOPHIL # BLD AUTO: 0.1 K/UL (ref 0–0.7)
EOSINOPHIL NFR BLD: 0.9 % (ref 0–4)
ERYTHROCYTE [DISTWIDTH] IN BLOOD BY AUTOMATED COUNT: 13.4 % (ref 11.5–14.5)
GFR NON-AFRICAN AMERICAN: > 60
GLUCOSE UR STRIP-MCNC: NORMAL MG/DL
HGB BLD-MCNC: 13.5 G/DL (ref 11–16)
LEUKOCYTE ESTERASE UR-ACNC: (no result) LEU/UL
LIPASE: 159 U/L (ref 23–300)
LYMPHOCYTES # BLD AUTO: 3.2 K/UL (ref 1–4.3)
LYMPHOCYTES NFR BLD AUTO: 22.2 % (ref 20–40)
MCH RBC QN AUTO: 31.1 PG (ref 27–31)
MCHC RBC AUTO-ENTMCNC: 33.3 G/DL (ref 33–37)
MCV RBC AUTO: 93.5 FL (ref 81–99)
MONOCYTES # BLD: 0.8 K/UL (ref 0–0.8)
MONOCYTES NFR BLD: 5.2 % (ref 0–10)
NEUTROPHILS # BLD: 10.3 K/UL (ref 1.8–7)
NEUTROPHILS NFR BLD AUTO: 71.2 % (ref 50–75)
NRBC BLD AUTO-RTO: 0 % (ref 0–2)
PH UR STRIP: 5 [PH] (ref 5–8)
PLATELET # BLD: 375 K/UL (ref 130–400)
PMV BLD AUTO: 8.3 FL (ref 7.2–11.7)
PROT UR STRIP-MCNC: NEGATIVE MG/DL
RBC # BLD AUTO: 4.33 MIL/UL (ref 3.8–5.2)
RBC # UR STRIP: NEGATIVE /UL
SP GR UR STRIP: 1.02 (ref 1–1.03)
SQUAMOUS EPITHIAL: 1 /HPF (ref 0–5)
UROBILINOGEN UR-MCNC: NORMAL MG/DL (ref 0.2–1)
WBC # BLD AUTO: 14.5 K/UL (ref 4.8–10.8)

## 2018-12-20 PROCEDURE — 96375 TX/PRO/DX INJ NEW DRUG ADDON: CPT

## 2018-12-20 PROCEDURE — 83690 ASSAY OF LIPASE: CPT

## 2018-12-20 PROCEDURE — 81001 URINALYSIS AUTO W/SCOPE: CPT

## 2018-12-20 PROCEDURE — 85025 COMPLETE CBC W/AUTO DIFF WBC: CPT

## 2018-12-20 PROCEDURE — 74176 CT ABD & PELVIS W/O CONTRAST: CPT

## 2018-12-20 PROCEDURE — 96374 THER/PROPH/DIAG INJ IV PUSH: CPT

## 2018-12-20 PROCEDURE — 99284 EMERGENCY DEPT VISIT MOD MDM: CPT

## 2018-12-20 PROCEDURE — 87086 URINE CULTURE/COLONY COUNT: CPT

## 2018-12-20 PROCEDURE — 80053 COMPREHEN METABOLIC PANEL: CPT

## 2018-12-20 NOTE — CT
Date of service: 



12/20/2018



PROCEDURE:  CT Abdomen and Pelvis..



HISTORY:

Left flank pain



COMPARISON:

Comparison made with prior study dated 07/10/2018..



TECHNIQUE:

Contiguous axial images of the abdomen and pelvis performed without 

oral or intravenous contrast material.  Additional 2D sagittal and 

coronal reformats generated. 



Radiation dose:



Total exam DLP = 375.85 mGy-cm.



This CT exam was performed using one or more of the following dose 

reduction techniques: Automated exposure control, adjustment of the 

mA and/or kV according to patient size, and/or use of iterative 

reconstruction technique..  The 



FINDINGS:



LOWER THORAX:

Unremarkable.



LIVER:

Liver exhibits normal size and attenuation pattern without masses 

collections or calcifications.  No obvious hepatic masses collections 

or calcifications..  No significant intrahepatic biliary ductal 

dilatation 



GALLBLADDER AND BILE DUCTS:

Gallbladder physiologically distended.  No evidence of intraluminal 

gallbladder calculi.  No significant ductal dilatation 



PANCREAS:

Less well seen on this study (due to the lack of circulating 

intravenous contrast material) is an elliptical shaped approximately 

14 mm x 11 mm low-attenuation lesion within the distal body/of 

pancreatic tail junction which exhibits Hounsfield units in the low 

single digits.  This could represent a small pancreatic pseudocyst 

however on benign or malignant cystic neoplasm of the pancreas not 

excluded..  



SPLEEN:

Spleen exhibits normal size and attenuation pattern without mass 

collection or calcification.. 



ADRENALS:

No definitive adrenal lesions. 



KIDNEYS AND URETERS:

Again noted is a horseshoe kidney.  No evidence of nephrolithiasis or 

hydronephrosis..  



BLADDER:

Urinary bladder is incompletely distended which presumably in part 

accounts for thick-walled appearance.  Correlation with urinalysis 

recommended to exclude a cystitis.



REPRODUCTIVE:

Unremarkable as visualized. 



APPENDIX:

The redemonstrated are what appear to represent opaque suture and/or 

metallic surgical clips in the right lower quadrant of the abdomen.  

The appendix is not seen with any certainty.  Findings could be 

secondary to prior appendectomy however clinical correlation with 

surgical history recommended.







BOWEL:

Evaluation of the bowel is somewhat limited due to the lack of oral 

contrast material.  The stomach is distended with food debris liquid 

and air.  Visualized loops of small bowel exhibit normal contour 

caliber.  No evidence of acute mechanical small bowel obstruction 

however note made of some fecalized content within few loops of small 

bowel suggesting mild stasis. 



There is a large amount of stool seen throughout the cecum, ascending 

transverse and to a lesser degree proximal descending and sigmoid 

colon consistent with fecal retention/constipation 



PERITONEUM:

Unremarkable. No fluid collection. No free air.



LYMPH NODES:

Unremarkable. No enlarged lymph nodes.  Small fat containing 

umbilical hernia. 



VASCULATURE:

Unremarkable. No aortic aneurysm. No aortic atherosclerotic 

calcification or mural plaque present.



BONES:

Minor multilevel degenerative spondylosis of the lower thoracic and 

lumbar spine.  Minimal anterior subluxation L5 over S1 felt to be 

secondary to facet arthropathy as noted obvious pars interarticularis 

defect the.  Degenerative changes both hip joints.



OTHER FINDINGS:

In situ right breast prosthesis again noted.  Left breast prosthesis 

noted on prior exam is a has not been visualized on this study.. 



IMPRESSION:

Less well seen on this study is an elliptical shaped cystic lesion 

within the distal pancreatic body/tail junction.  This lesion is of 

uncertain etiology and could represent a pancreatic pseudocyst 

however benign or malignant cystic pancreatic neoplasm not excluded. 



Horseshoe kidney again noted.  No evidence of nephrolithiasis or 

hydronephrosis 



Apparent postoperative changes of appendectomy although clinical 

correlation with history recommended..  The 



Findings consistent with constipation 



Redemonstrated though mild fatty hepatic infiltration.



Note this report was placed in PA review folder for follow up.

## 2018-12-20 NOTE — C.PDOC
History Of Present Illness





Patient seen tonite due to suddn onset of left flank pain radiating to the 

groin. 


Chief Complaint (Nursing): Abdominal Pain


History Per: Patient


History/Exam Limitations: no limitations


Onset/Duration Of Symptoms: Hrs


Current Symptoms Are (Timing): Still Present


Severity: Severe


Pain Scale Rating Of: 8


Location Of Pain/Discomfort: LUQ


Radiation Of Pain To:: Flank


Quality Of Discomfort: Sharp


Associated Symptoms: Nausea.  denies: Fever, Chills, Vomiting


Exacerbating Factors: None, Supine, Movement


Alleviating Factors: None


Last Bowel Movement: Today


Recent travel outside of the [place]: No


Additional History Per: Patient


Abnormal Vaginal Bleeding: No





Past Medical History


Vital Signs: 





                                Last Vital Signs











Temp  97.6 F   12/20/18 01:13


 


Pulse  96 H  12/20/18 01:13


 


Resp  18   12/20/18 01:13


 


BP      


 


Pulse Ox  100   12/20/18 01:13














- Medical History


PMH: Anxiety, Depression, HTN, Hypercholesterolemia, Osteoporosis


   Denies: Chronic Kidney Disease


Surgical History: Appendectomy, Endoscopy





- [facility] Procedures











DIPHTHERIA TOXOID ADMIN (06/03/14)


IMMOBILIZ/WOUND ATTN NEC (05/15/14)


TETANUS TOXOID ADMINIST (06/03/14)








Family History: States: Unknown Family Hx





- Social History


Hx Alcohol Use: No


Hx Substance Use: No





- Immunization History


Hx Tetanus Toxoid Vaccination: No


Hx Influenza Vaccination: No


Hx Pneumococcal Vaccination: No





Review Of Systems


Constitutional: Negative for: Fever, Chills, Sweats


Cardiovascular: Negative for: Chest Pain, Palpitations, Orthopnea


Respiratory: Negative for: Cough, Shortness of Breath, Hemoptysis


Gastrointestinal: Positive for: Nausea, Abdominal Pain.  Negative for: Vomiting


Genitourinary: Negative for: Dysuria, Frequency, Incontinence, Hematuria


Musculoskeletal: Negative for: Neck Pain, Shoulder Pain


Skin: Negative for: Rash, Lesions


Neurological: Negative for: Weakness, Numbness


Psych: Negative for: Anxiety, Depression





Physical Exam





- Physical Exam


Appears: In Acute Distress


Skin: Normal Color


Head: Atraumatic


Nose: Normal


Lips: Normal Appearing


Throat: Normal


Neck: Normal


Chest: Symmetrical, No Deformity, No Tenderness


Cardiovascular: Rhythm Regular


Respiratory: Normal Breath Sounds


Gastrointestinal/Abdominal: Normal Exam, Tenderness (left flank /upper abd 

area), Other (tenderness left flank and left aupper quadrant of abd.)


Back: Normal Inspection, No CVA Tenderness





ED Course And Treatment





- Laboratory Results


Result Diagrams: 


                                 12/20/18 02:19





                                 12/20/18 02:19


O2 Sat by Pulse Oximetry: 100





Disposition


Counseled Patient/Family Regarding: Diagnosis





- Disposition


Referrals: 


Ralph Lyons MD [Staff Provider] - 


Disposition: HOME/ ROUTINE


Disposition Time: 03:39


Condition: STABLE


Prescriptions: 


Ciprofloxacin [Cipro] 500 mg PO BID #14 tab


traMADol/Acetaminophen [Ultracet 325 MG-37.5 MG] 1 tab PO Q6 #12 tab


Instructions:  Urinary Tract Infections in Adults, Acute Abdomen (Belly Pain), 

Adult (DC), Flank Pain (DC)


Forms:  [facility] (English)





- POA


Present On Arrival: None





- Clinical Impression


Clinical Impression: 


 Abdominal pain, UTI (urinary tract infection), Flank pain

## 2019-03-11 ENCOUNTER — HOSPITAL ENCOUNTER (OUTPATIENT)
Dept: HOSPITAL 42 - ENDO | Age: 69
Discharge: HOME | End: 2019-03-11
Attending: INTERNAL MEDICINE
Payer: MEDICARE

## 2019-03-11 VITALS — SYSTOLIC BLOOD PRESSURE: 122 MMHG | DIASTOLIC BLOOD PRESSURE: 66 MMHG | HEART RATE: 70 BPM

## 2019-03-11 VITALS — OXYGEN SATURATION: 97 % | RESPIRATION RATE: 16 BRPM | TEMPERATURE: 98.7 F

## 2019-03-11 VITALS — BODY MASS INDEX: 23.8 KG/M2

## 2019-03-11 DIAGNOSIS — E78.5: ICD-10-CM

## 2019-03-11 DIAGNOSIS — K29.50: ICD-10-CM

## 2019-03-11 DIAGNOSIS — Z87.11: ICD-10-CM

## 2019-03-11 DIAGNOSIS — E11.9: ICD-10-CM

## 2019-03-11 DIAGNOSIS — Z86.010: ICD-10-CM

## 2019-03-11 DIAGNOSIS — K86.2: Primary | ICD-10-CM

## 2019-03-11 DIAGNOSIS — I10: ICD-10-CM

## 2019-03-11 DIAGNOSIS — E78.00: ICD-10-CM

## 2019-03-11 LAB
AMYLASE SERPL-CCNC: 76 U/L (ref 35–125)
APTT BLD: 31 SECONDS (ref 26.9–38.3)
BASOPHILS # BLD AUTO: 0.02 K/MM3 (ref 0–2)
BASOPHILS NFR BLD: 0.3 % (ref 0–3)
BUN SERPL-MCNC: 16 MG/DL (ref 7–21)
CALCIUM SERPL-MCNC: 9.3 MG/DL (ref 8.4–10.5)
EOSINOPHIL # BLD: 0.1 10*3/UL (ref 0–0.7)
EOSINOPHIL NFR BLD: 1.6 % (ref 1.5–5)
ERYTHROCYTE [DISTWIDTH] IN BLOOD BY AUTOMATED COUNT: 13.4 % (ref 11.5–14.5)
GAMMA GLUTAMYL TRANSPEPTIDASE: 12 U/L (ref 8–78)
GFR NON-AFRICAN AMERICAN: > 60
HGB BLD-MCNC: 12.5 G/DL (ref 12–16)
INR PPP: 1.12
LIPASE SERPL-CCNC: 86 U/L (ref 23–300)
LYMPHOCYTES # BLD: 2.2 10*3/UL (ref 1.2–3.4)
LYMPHOCYTES NFR BLD AUTO: 38.1 % (ref 22–35)
MCH RBC QN AUTO: 30.2 PG (ref 25–35)
MCHC RBC AUTO-ENTMCNC: 32.6 G/DL (ref 31–37)
MCV RBC AUTO: 92.8 FL (ref 80–105)
MONOCYTES # BLD AUTO: 0.3 10*3/UL (ref 0.1–0.6)
MONOCYTES NFR BLD: 5 % (ref 1–6)
PLATELET # BLD: 334 10^3/UL (ref 120–450)
PMV BLD AUTO: 10 FL (ref 7–11)
PROTHROMBIN TIME: 12.6 SECONDS (ref 9.4–12.5)
RBC # BLD AUTO: 4.14 10^6/UL (ref 3.5–6.1)
WBC # BLD AUTO: 5.8 10^3/UL (ref 4.5–11)

## 2019-03-11 PROCEDURE — 80048 BASIC METABOLIC PNL TOTAL CA: CPT

## 2019-03-11 PROCEDURE — 88173 CYTOPATH EVAL FNA REPORT: CPT

## 2019-03-11 PROCEDURE — 43239 EGD BIOPSY SINGLE/MULTIPLE: CPT

## 2019-03-11 PROCEDURE — 36415 COLL VENOUS BLD VENIPUNCTURE: CPT

## 2019-03-11 PROCEDURE — 82150 ASSAY OF AMYLASE: CPT

## 2019-03-11 PROCEDURE — 88305 TISSUE EXAM BY PATHOLOGIST: CPT

## 2019-03-11 PROCEDURE — 43242 EGD US FINE NEEDLE BX/ASPIR: CPT

## 2019-03-11 PROCEDURE — 83690 ASSAY OF LIPASE: CPT

## 2019-03-11 PROCEDURE — 85025 COMPLETE CBC W/AUTO DIFF WBC: CPT

## 2019-03-11 PROCEDURE — 82977 ASSAY OF GGT: CPT

## 2019-03-11 PROCEDURE — 85730 THROMBOPLASTIN TIME PARTIAL: CPT

## 2019-03-11 PROCEDURE — 88342 IMHCHEM/IMCYTCHM 1ST ANTB: CPT

## 2019-03-11 PROCEDURE — 85610 PROTHROMBIN TIME: CPT
